# Patient Record
Sex: FEMALE | Race: WHITE | NOT HISPANIC OR LATINO | Employment: OTHER | URBAN - METROPOLITAN AREA
[De-identification: names, ages, dates, MRNs, and addresses within clinical notes are randomized per-mention and may not be internally consistent; named-entity substitution may affect disease eponyms.]

---

## 2024-08-07 PROBLEM — M79.604 LUMBAR PAIN WITH RADIATION DOWN RIGHT LEG: Status: ACTIVE | Noted: 2024-08-07

## 2024-08-07 PROBLEM — R23.8 PAPULE OF SKIN: Status: ACTIVE | Noted: 2024-08-07

## 2024-08-07 PROBLEM — M54.50 LUMBAR PAIN WITH RADIATION DOWN RIGHT LEG: Status: ACTIVE | Noted: 2024-08-07

## 2024-08-07 PROBLEM — I10 HTN (HYPERTENSION): Status: ACTIVE | Noted: 2024-08-07

## 2024-08-07 PROBLEM — M19.90 ARTHRITIS: Status: ACTIVE | Noted: 2024-08-07

## 2024-08-07 PROBLEM — R03.0 ELEVATED BP WITHOUT DIAGNOSIS OF HYPERTENSION: Status: ACTIVE | Noted: 2024-08-07

## 2024-08-07 PROBLEM — E78.5 HYPERLIPIDEMIA: Status: ACTIVE | Noted: 2024-08-07

## 2024-08-07 PROBLEM — R00.2 PALPITATION: Status: ACTIVE | Noted: 2024-08-07

## 2024-08-07 PROBLEM — M79.89 SWELLING OF LOWER EXTREMITY: Status: ACTIVE | Noted: 2024-08-07

## 2024-08-07 PROBLEM — M47.816 DEGENERATIVE JOINT DISEASE (DJD) OF LUMBAR SPINE: Status: ACTIVE | Noted: 2024-08-07

## 2024-08-09 ENCOUNTER — OFFICE VISIT (OUTPATIENT)
Dept: PRIMARY CARE | Facility: CLINIC | Age: 69
End: 2024-08-09
Payer: MEDICARE

## 2024-08-09 VITALS
WEIGHT: 244.4 LBS | DIASTOLIC BLOOD PRESSURE: 82 MMHG | SYSTOLIC BLOOD PRESSURE: 142 MMHG | HEART RATE: 72 BPM | BODY MASS INDEX: 41.73 KG/M2 | OXYGEN SATURATION: 98 % | HEIGHT: 64 IN

## 2024-08-09 DIAGNOSIS — E78.00 PURE HYPERCHOLESTEROLEMIA: ICD-10-CM

## 2024-08-09 DIAGNOSIS — Z13.29 SCREENING FOR THYROID DISORDER: ICD-10-CM

## 2024-08-09 DIAGNOSIS — Z12.11 COLON CANCER SCREENING: ICD-10-CM

## 2024-08-09 DIAGNOSIS — E66.01 CLASS 3 SEVERE OBESITY DUE TO EXCESS CALORIES WITHOUT SERIOUS COMORBIDITY WITH BODY MASS INDEX (BMI) OF 40.0 TO 44.9 IN ADULT (MULTI): ICD-10-CM

## 2024-08-09 DIAGNOSIS — I10 PRIMARY HYPERTENSION: ICD-10-CM

## 2024-08-09 DIAGNOSIS — Z12.4 CERVICAL CANCER SCREENING: ICD-10-CM

## 2024-08-09 DIAGNOSIS — M54.16 LUMBAR RADICULOPATHY, RIGHT: Primary | ICD-10-CM

## 2024-08-09 DIAGNOSIS — Z12.31 ENCOUNTER FOR SCREENING MAMMOGRAM FOR MALIGNANT NEOPLASM OF BREAST: ICD-10-CM

## 2024-08-09 PROCEDURE — 3008F BODY MASS INDEX DOCD: CPT | Performed by: INTERNAL MEDICINE

## 2024-08-09 PROCEDURE — 1159F MED LIST DOCD IN RCRD: CPT | Performed by: INTERNAL MEDICINE

## 2024-08-09 PROCEDURE — 1123F ACP DISCUSS/DSCN MKR DOCD: CPT | Performed by: INTERNAL MEDICINE

## 2024-08-09 PROCEDURE — 3079F DIAST BP 80-89 MM HG: CPT | Performed by: INTERNAL MEDICINE

## 2024-08-09 PROCEDURE — 1158F ADVNC CARE PLAN TLK DOCD: CPT | Performed by: INTERNAL MEDICINE

## 2024-08-09 PROCEDURE — 3077F SYST BP >= 140 MM HG: CPT | Performed by: INTERNAL MEDICINE

## 2024-08-09 PROCEDURE — 1036F TOBACCO NON-USER: CPT | Performed by: INTERNAL MEDICINE

## 2024-08-09 PROCEDURE — 99214 OFFICE O/P EST MOD 30 MIN: CPT | Performed by: INTERNAL MEDICINE

## 2024-08-09 RX ORDER — IBUPROFEN 200 MG
200 TABLET ORAL EVERY 6 HOURS
COMMUNITY

## 2024-08-09 ASSESSMENT — PATIENT HEALTH QUESTIONNAIRE - PHQ9
2. FEELING DOWN, DEPRESSED OR HOPELESS: NOT AT ALL
1. LITTLE INTEREST OR PLEASURE IN DOING THINGS: NOT AT ALL
SUM OF ALL RESPONSES TO PHQ9 QUESTIONS 1 & 2: 0

## 2024-08-09 ASSESSMENT — ENCOUNTER SYMPTOMS
DEPRESSION: 0
OCCASIONAL FEELINGS OF UNSTEADINESS: 0
LOSS OF SENSATION IN FEET: 0

## 2024-08-09 NOTE — PROGRESS NOTES
"Internal Medicine Outpatient Visit  Chief Complaint   Patient presents with    Sciatica     Sciatic nerve on right side, x 6 years.        HPI: Emilie Fair is of 68 y.o. who is here for Internal Visit for the following issues:  Patient is seen in office today after labs of almost 2 years with chief complaint of backache radiating to the left leg.  Pain is worse after the activity.  She has no weakness of the legs.  She denies any bladder and bowel disturbances.  Did not have any recent fall.  She has no respiratory symptoms.  Denies any chest pain or palpitation.  Has no nausea matting pain abdomen.  She has no weakness of any extremity.  Besides the right leg pain and back pain she has no other joint pain or swelling.  She has no cold intolerance any symptoms of thyroid dysfunction.  Denies any urinary symptoms.  Review of other systems are negative.    Past Surgical History:   Procedure Laterality Date    OTHER SURGICAL HISTORY  12/27/2021    No history of surgery       No family history on file.      Current Outpatient Medications on File Prior to Visit   Medication Sig Dispense Refill    ibuprofen (Motrin IB) 200 mg tablet Take 1 tablet (200 mg) by mouth every 6 hours.       No current facility-administered medications on file prior to visit.       Blood pressure 142/82, pulse 72, height 1.626 m (5' 4\"), weight 111 kg (244 lb 6.4 oz), SpO2 98%.  Body mass index is 41.95 kg/m².  General examination: Severe obesity is notable  Eyes: There is no pallor or jaundice pupils are equal and reactive to light  Oral cavity throat normal  Neck: There is no carotid bruit or JVD  Lungs: Breath sounds are normal  CVS: Heart sounds are regular there is a soft systolic murmur noted in the left upper parasternal area.  Abdomen: There is no megaly tenderness  CNS: All cranial nerves are intact power is normal.  There is subjective sensation of pain on the lateral aspect of right thigh.  But no actual loss of sensations is " noted.  Back: There is no local deformity or tenderness  Legs: No edema  Skin: No rash    Assessment and plan:    1. Lumbar radiculopathy, right  Patient has history of degenerative disease of lumbar spine.  She had x-ray of the back done a couple of years ago which showed degenerative changes.  She was referred for physical therapy without significant improvement.  I will order MRI for further evaluation.  - Comprehensive metabolic panel; Future  - CBC and Auto Differential; Future  - Lipid panel; Future  - Tsh With Reflex To Free T4 If Abnormal; Future  - MR lumbar spine wo IV contrast; Future    2. Primary hypertension  Patient blood pressure readings are still high.  She is not taking any medication lifestyle modifications including exercise cutting down salt and fatty food is discussed I will bring him back in a few weeks for repeat blood pressure checkup and advise regarding starting medications if needed  - Comprehensive metabolic panel; Future  - CBC and Auto Differential; Future  - Lipid panel; Future  - Tsh With Reflex To Free T4 If Abnormal; Future    3. Pure hypercholesterolemia  Patient is stopped Crestor on her own.  Repeat lipid panel is being ordered.  Advised to continue with a low-fat diet exercise in the meantime  - Comprehensive metabolic panel; Future  - CBC and Auto Differential; Future  - Lipid panel; Future  - Tsh With Reflex To Free T4 If Abnormal; Future    4. Class 3 severe obesity due to excess calories without serious comorbidity with body mass index (BMI) of 40.0 to 44.9 in adult (Multi)  She is educated about cardiovascular risk factors.  Encouraged to cut down calorie intake and exercise regularly  - Comprehensive metabolic panel; Future  - CBC and Auto Differential; Future  - Lipid panel; Future  - Tsh With Reflex To Free T4 If Abnormal; Future    5. Encounter for screening mammogram for malignant neoplasm of breast  Last mammogram was done in 2022.  Repeat order is given  - BI mammo  bilateral screening tomosynthesis; Future  - Comprehensive metabolic panel; Future  - CBC and Auto Differential; Future  - Lipid panel; Future  - Tsh With Reflex To Free T4 If Abnormal; Future    6. Colon cancer screening  According to her she has a colonoscopy done about 3 years ago.  Repeat colonoscopy is planned according to the patient by her gastroenterologist.  New referral is given  - Referral to Gastroenterology; Future  - Comprehensive metabolic panel; Future  - CBC and Auto Differential; Future  - Lipid panel; Future  - Tsh With Reflex To Free T4 If Abnormal; Future    7. Cervical cancer screening  Patient had pelvic examination about 2 years ago.  She needs a new referral which is given to the patient  - Referral to Gynecology; Future  - Comprehensive metabolic panel; Future  - CBC and Auto Differential; Future  - Lipid panel; Future  - Tsh With Reflex To Free T4 If Abnormal; Future    8. Screening for thyroid disorder  - Comprehensive metabolic panel; Future  - CBC and Auto Differential; Future  - Lipid panel; Future  - Tsh With Reflex To Free T4 If Abnormal; Future    Follow-up appointment will depend on the results of the test ordered today.

## 2024-08-13 ENCOUNTER — HOSPITAL ENCOUNTER (OUTPATIENT)
Dept: RADIOLOGY | Facility: CLINIC | Age: 69
End: 2024-08-13
Payer: MEDICARE

## 2024-08-19 ENCOUNTER — PATIENT OUTREACH (OUTPATIENT)
Dept: CARE COORDINATION | Facility: CLINIC | Age: 69
End: 2024-08-19
Payer: MEDICARE

## 2024-08-19 NOTE — PROGRESS NOTES
Date: 08/19/24    Dear Emilie Fair    Our records indicate that you are due for the following appointment or test: Annual Wellness Visit      Please call our office at your earliest convenience. We can assist you with scheduling an appointment or test.  If you have already scheduled an appointment or have completed testing, please disregard this letter.    Sincerely,    Abena Mccall    [facility]  [address] [place] [address]    Office Phone: [phone]  Patient Navigator: [phone]

## 2024-09-05 ENCOUNTER — APPOINTMENT (OUTPATIENT)
Dept: RADIOLOGY | Facility: CLINIC | Age: 69
End: 2024-09-05
Payer: MEDICARE

## 2024-11-19 ENCOUNTER — HOSPITAL ENCOUNTER (EMERGENCY)
Facility: HOSPITAL | Age: 69
Discharge: HOME | End: 2024-11-19
Attending: EMERGENCY MEDICINE
Payer: MEDICARE

## 2024-11-19 ENCOUNTER — APPOINTMENT (OUTPATIENT)
Dept: RADIOLOGY | Facility: HOSPITAL | Age: 69
End: 2024-11-19
Payer: MEDICARE

## 2024-11-19 VITALS
SYSTOLIC BLOOD PRESSURE: 186 MMHG | HEIGHT: 64 IN | TEMPERATURE: 98.2 F | DIASTOLIC BLOOD PRESSURE: 89 MMHG | WEIGHT: 240 LBS | BODY MASS INDEX: 40.97 KG/M2 | RESPIRATION RATE: 22 BRPM | OXYGEN SATURATION: 99 % | HEART RATE: 88 BPM

## 2024-11-19 DIAGNOSIS — M25.561 ACUTE PAIN OF RIGHT KNEE: Primary | ICD-10-CM

## 2024-11-19 DIAGNOSIS — M17.11 OSTEOARTHRITIS OF RIGHT KNEE, UNSPECIFIED OSTEOARTHRITIS TYPE: ICD-10-CM

## 2024-11-19 PROCEDURE — 73564 X-RAY EXAM KNEE 4 OR MORE: CPT | Mod: RT

## 2024-11-19 PROCEDURE — 2500000004 HC RX 250 GENERAL PHARMACY W/ HCPCS (ALT 636 FOR OP/ED): Performed by: EMERGENCY MEDICINE

## 2024-11-19 PROCEDURE — 73564 X-RAY EXAM KNEE 4 OR MORE: CPT | Mod: RIGHT SIDE | Performed by: RADIOLOGY

## 2024-11-19 PROCEDURE — 99283 EMERGENCY DEPT VISIT LOW MDM: CPT

## 2024-11-19 PROCEDURE — 96372 THER/PROPH/DIAG INJ SC/IM: CPT | Performed by: EMERGENCY MEDICINE

## 2024-11-19 RX ORDER — NAPROXEN 500 MG/1
500 TABLET ORAL
Qty: 28 TABLET | Refills: 0 | Status: SHIPPED | OUTPATIENT
Start: 2024-11-19 | End: 2024-12-03

## 2024-11-19 RX ORDER — KETOROLAC TROMETHAMINE 15 MG/ML
15 INJECTION, SOLUTION INTRAMUSCULAR; INTRAVENOUS ONCE
Status: COMPLETED | OUTPATIENT
Start: 2024-11-19 | End: 2024-11-19

## 2024-11-19 ASSESSMENT — PAIN - FUNCTIONAL ASSESSMENT
PAIN_FUNCTIONAL_ASSESSMENT: 0-10
PAIN_FUNCTIONAL_ASSESSMENT: 0-10

## 2024-11-19 ASSESSMENT — COLUMBIA-SUICIDE SEVERITY RATING SCALE - C-SSRS
6. HAVE YOU EVER DONE ANYTHING, STARTED TO DO ANYTHING, OR PREPARED TO DO ANYTHING TO END YOUR LIFE?: NO
2. HAVE YOU ACTUALLY HAD ANY THOUGHTS OF KILLING YOURSELF?: NO
1. IN THE PAST MONTH, HAVE YOU WISHED YOU WERE DEAD OR WISHED YOU COULD GO TO SLEEP AND NOT WAKE UP?: NO

## 2024-11-19 ASSESSMENT — LIFESTYLE VARIABLES
EVER HAD A DRINK FIRST THING IN THE MORNING TO STEADY YOUR NERVES TO GET RID OF A HANGOVER: NO
HAVE YOU EVER FELT YOU SHOULD CUT DOWN ON YOUR DRINKING: NO
HAVE PEOPLE ANNOYED YOU BY CRITICIZING YOUR DRINKING: NO
EVER FELT BAD OR GUILTY ABOUT YOUR DRINKING: NO
TOTAL SCORE: 0

## 2024-11-19 ASSESSMENT — PAIN SCALES - GENERAL
PAINLEVEL_OUTOF10: 0 - NO PAIN
PAINLEVEL_OUTOF10: 8
PAINLEVEL_OUTOF10: 0 - NO PAIN

## 2024-11-19 ASSESSMENT — PAIN DESCRIPTION - ORIENTATION: ORIENTATION: RIGHT

## 2024-11-19 ASSESSMENT — PAIN DESCRIPTION - LOCATION: LOCATION: LEG

## 2024-11-19 NOTE — ED PROVIDER NOTES
Emergency Department Provider Note        History of Present Illness     History provided by: Patient  Limitations to History: None  External Records Reviewed with Brief Summary: None    HPI:  Emilie Fair is a 68 y.o. female The patient presents with a chief complaint of right knee pain for the past month. The pain initially began after the patient banged their leg, resulting in swelling that eventually improved. Two weeks later, the patient [place], causing scratches on both sides of their leg. Since attending a wedding on Saturday and dancing, the patient has experienced clicking in their right knee. The pain is described as 8 out of 10 in intensity.    The patient reports pain in specific spots around the knee, particularly when pressing on certain areas and during specific movements, such as pushing. They have found relief with acetaminophen and Motrin, which has reduced the swelling and improved mobility. The patient reports difficulty walking due to pain and moves slowly as a result.    The patient denies taking any other medications, although they mention they are supposed to be on medication for blood pressure. They report occasional alcohol consumption and marijuana use, with no history of cigarette smoking. The patient has no known allergies to medications, no history of surgeries, and no family history of cancer or blood clots. The patient expresses concern about the possibility of needing surgery and is considering further evaluation with an x-ray, sports therapist, or orthopedic surgeon for potential rehabilitation or injections.    Physical Exam   Triage vitals:  T 36.8 °C (98.2 °F)  HR 88  BP (!) 186/89  RR (!) 22  O2 99 % None (Room air)    General: Awake, alert, in no acute distress  Eyes: Gaze conjugate.  No scleral icterus or injection  HENT: Normo-cephalic, atraumatic. No stridor  CV: Regular rate, regular rhythm. Radial pulses 2+  bilaterally  Resp: Breathing non-labored, speaking in full sentences.  Clear to auscultation bilaterally  GI: Soft, non-distended, non-tender. No rebound or guarding.  MSK/Extremities: No gross bony deformities. Moving all extremities. Negative Lever sign. No valgus or varus pain. Pain with knee extension against resistance.  Skin: Warm. Appropriate color  Neuro: Alert. Oriented. Face symmetric. Speech is fluent.  Gross strength and sensation intact in b/l UE and LEs  Psych: Appropriate mood and affect    Medical Decision Making & ED Course   Medical Decision Makin y.o. female presenting with right knee pain and clicking, with a history of trauma from a bridge fall one month ago and recent exacerbation during dancing. The patient also has a history of hypertension and reports occasional alcohol consumption and marijuana use.    Based on the patient's symptoms and history, the primary concern is for meniscus, ligamentous, or osteoarthritis injury. Additionally, the patient's hypertension and medication non-adherence need to be addressed but is likely elevated secondary to her pain currently.    We will plan to obtain a knee x-ray to evaluate for any structural damage or fractures.  Will provide referral to sports therapist or orthopedic surgeon for further evaluation and possible rehabilitation or injections. Advise patient to avoid activities that exacerbate pain and to use over-the-counter pain relievers as needed. Monitor pain and consider prescription pain relief if the pain increases.    For hypertension management, reinforce the importance of taking prescribed blood pressure medication regularly and schedule a follow-up appointment to monitor blood pressure and medication adherence. ecommend regular exercise and a healthy diet to support overall health and well-being.  ----   Social Determinants of Health which Significantly Impact Care: None identified     EKG Independent Interpretation: EKG not  obtained    Independent Result Review and Interpretation: Relevant laboratory and radiographic results were reviewed and independently interpreted by myself.  As necessary, they are commented on in the ED Course.    Chronic conditions affecting the patient's care: As documented above in [facility]    The patient was discussed with the following consultants/services: None    Care Considerations: As documented above in [facility]    ED Course:  ED Course as of 11/20/24 0913 Tue Nov 19, 2024   1440 XR knee right 4+ views  Unimpressive for fracture or dislocation but there is development of osteoarthritis with mild joint effusion. [ES]      ED Course User Index  [ES] Chicho Diana MD         Diagnoses as of 11/20/24 0913   Acute pain of right knee   Osteoarthritis of right knee, unspecified osteoarthritis type     Disposition   As a result of the work-up, the patient was discharged home.  she was informed of her diagnosis and instructed to come back with any concerns or worsening of condition.  she and was agreeable to the plan as discussed above.  she was given the opportunity to ask questions.  All of the patient's questions were answered.    Procedures   Procedures    This was a shared visit with an ED attending.  The patient was seen and discussed with the ED attending    Chicho Diana MD  Emergency Medicine, PGY3     Chicho Diana MD  Resident  11/20/24 0913

## 2024-11-19 NOTE — DISCHARGE INSTRUCTIONS
You came to the emergency department (ED) after having right knee pain. We evaluated you and did not find any life-threatening injuries.     Steps to take at home:  - You can use ice packs or take acetaminophen (eg, Tylenol) and ibuprofen (eg, Motrin or Advil) for pain.  -Utilize knee strengthening exercises provided    Seek immediate medical attention if you develop: new or worsening weakness, leg swelling, loss of motion in your arms or legs, loss of control of your urine or stool, difficulty waking from sleep, or any new or worsening symptoms.

## 2024-12-19 ENCOUNTER — APPOINTMENT (OUTPATIENT)
Dept: ORTHOPEDIC SURGERY | Facility: CLINIC | Age: 69
End: 2024-12-19
Payer: MEDICARE

## 2024-12-19 DIAGNOSIS — M25.561 ACUTE PAIN OF RIGHT KNEE: ICD-10-CM

## 2024-12-19 PROCEDURE — 1123F ACP DISCUSS/DSCN MKR DOCD: CPT | Performed by: ORTHOPAEDIC SURGERY

## 2024-12-19 PROCEDURE — 99203 OFFICE O/P NEW LOW 30 MIN: CPT | Performed by: ORTHOPAEDIC SURGERY

## 2024-12-19 PROCEDURE — 20610 DRAIN/INJ JOINT/BURSA W/O US: CPT | Performed by: ORTHOPAEDIC SURGERY

## 2024-12-19 PROCEDURE — 1036F TOBACCO NON-USER: CPT | Performed by: ORTHOPAEDIC SURGERY

## 2024-12-19 PROCEDURE — 1159F MED LIST DOCD IN RCRD: CPT | Performed by: ORTHOPAEDIC SURGERY

## 2024-12-19 RX ORDER — TRIAMCINOLONE ACETONIDE 40 MG/ML
40 INJECTION, SUSPENSION INTRA-ARTICULAR; INTRAMUSCULAR
Status: COMPLETED | OUTPATIENT
Start: 2024-12-19 | End: 2024-12-19

## 2024-12-19 RX ORDER — LIDOCAINE HYDROCHLORIDE 10 MG/ML
2 INJECTION, SOLUTION INFILTRATION; PERINEURAL
Status: COMPLETED | OUTPATIENT
Start: 2024-12-19 | End: 2024-12-19

## 2024-12-19 NOTE — PROGRESS NOTES
History of Present Illness:  Patient presents with right knee pain.  The patient localizes the pain diffusely.  Recently there has been concern for falls and instability.  There is increasing difficulty with activities of daily living and significant disability related to the knee pain.  The patient endorses the following failed non-operative treatments: Rest, ice.   There is increasing frustration with persistent pain and swelling and decreasing distance of ambulation.  Pain is moderate, achy, diffuse.  Better with rest, worse with activity.     She had some mild pain but then has had 2 injuries in a short period of time 1 when she fell through small bridge while hiking.    Review of Systems   GENERAL: Negative for malaise, significant weight loss, fever  MUSCULOSKELETAL: see HPI  NEURO:  Negative    History reviewed. No pertinent past medical history.  Past Surgical History:   Procedure Laterality Date    OTHER SURGICAL HISTORY  12/27/2021    No history of surgery       Current Outpatient Medications:     ibuprofen (Motrin IB) 200 mg tablet, Take 1 tablet (200 mg) by mouth every 6 hours., Disp: , Rfl:       Physical Examination:  Right Knee:  Skin healthy and intact  No gross swelling or ecchymosis  Alignment: neutral      Effusion: mild       ROM: Decreased  Crepitance with range of motion  No pain with internal rotation of the hip  Tenderness to palpation over medial and lateral joint line and with patellar compression     No laxity to valgus stress  No laxity to varus stress  Negative Lachman´s test  Negative posterior drawer test  Mild pain with Greyson´s test  Neurovascular exam normal distally  2+ DP pulse and good cap refill    Radiographs:  Moderate patellofemoral degenerative joint disease with loss of joint space, subchondral sclerosis and cystic changes, and osteophyte formation    Assessment:  Patient with right knee osteoarthritis in the setting of some acute injuries    Plan:  We discussed the  diagnosis of degenerative joint disease of the knee.  We reviewed an evidence-based approach to osteoarthritis of the knee.  We strongly encouraged low-impact aerobic activity and non-opioid analgesics.       Based on the injuries we discussed possibility meniscal pathology insufficiency fracture etc.  Discussed aspiration injection and gentle physical therapy.    L Inj/Asp: R knee on 12/19/2024 1:46 PM  Indications: pain  Details: 22 G needle, anteromedial approach  Medications: 2 mL lidocaine 10 mg/mL (1 %); 40 mg triamcinolone acetonide 40 mg/mL  Aspirate: 20 mL clear and yellow  Outcome: tolerated well, no immediate complications  Procedure, treatment alternatives, risks and benefits explained, specific risks discussed. Consent was given by the patient. Immediately prior to procedure a time out was called to verify the correct patient, procedure, equipment, support staff and site/side marked as required. Patient was prepped and draped in the usual sterile fashion.

## 2025-02-13 ENCOUNTER — TELEPHONE (OUTPATIENT)
Dept: PRIMARY CARE | Facility: CLINIC | Age: 70
End: 2025-02-13
Payer: MEDICARE

## 2025-03-06 ENCOUNTER — HOSPITAL ENCOUNTER (OUTPATIENT)
Dept: RADIOLOGY | Facility: CLINIC | Age: 70
Discharge: HOME | End: 2025-03-06
Payer: MEDICARE

## 2025-03-06 DIAGNOSIS — M43.16 ANTEROLISTHESIS OF LUMBAR SPINE: ICD-10-CM

## 2025-03-06 DIAGNOSIS — M48.061 SPINAL STENOSIS OF LUMBAR REGION, UNSPECIFIED WHETHER NEUROGENIC CLAUDICATION PRESENT: Primary | ICD-10-CM

## 2025-03-06 DIAGNOSIS — M54.16 LUMBAR RADICULOPATHY, RIGHT: ICD-10-CM

## 2025-03-06 PROCEDURE — 72148 MRI LUMBAR SPINE W/O DYE: CPT

## 2025-03-14 ENCOUNTER — TELEPHONE (OUTPATIENT)
Dept: PRIMARY CARE | Facility: CLINIC | Age: 70
End: 2025-03-14
Payer: MEDICARE

## 2025-03-14 NOTE — TELEPHONE ENCOUNTER
----- Message from Mary Mtz sent at 3/6/2025  4:03 PM EST -----  I called the patient to give the results of MRI.  There was no response I left a message to call back.  The MRI shows that she has narrowing of the spinal canal and there is some indication of pressure on the nerves.  She needs to see an spinal surgeon.  I have placed a referral in the chart.  She has not been seen in our office for more than 6 months she also needs to make a follow-up appointment in our office  ----- Message -----  From: Interface, Radiology Results In  Sent: 3/6/2025   3:34 PM EST  To: Mary Mtz MD

## 2025-04-15 ENCOUNTER — APPOINTMENT (OUTPATIENT)
Dept: OBSTETRICS AND GYNECOLOGY | Facility: CLINIC | Age: 70
End: 2025-04-15
Payer: MEDICARE

## 2025-04-15 ENCOUNTER — HOSPITAL ENCOUNTER (OUTPATIENT)
Dept: RADIOLOGY | Facility: CLINIC | Age: 70
Discharge: HOME | End: 2025-04-15
Payer: MEDICARE

## 2025-04-15 VITALS
SYSTOLIC BLOOD PRESSURE: 178 MMHG | WEIGHT: 245 LBS | BODY MASS INDEX: 56.7 KG/M2 | DIASTOLIC BLOOD PRESSURE: 89 MMHG | HEIGHT: 55 IN

## 2025-04-15 DIAGNOSIS — Z12.31 ENCOUNTER FOR SCREENING MAMMOGRAM FOR MALIGNANT NEOPLASM OF BREAST: Primary | ICD-10-CM

## 2025-04-15 DIAGNOSIS — Z12.31 ENCOUNTER FOR SCREENING MAMMOGRAM FOR MALIGNANT NEOPLASM OF BREAST: ICD-10-CM

## 2025-04-15 PROCEDURE — 1123F ACP DISCUSS/DSCN MKR DOCD: CPT | Performed by: ADVANCED PRACTICE MIDWIFE

## 2025-04-15 PROCEDURE — 3008F BODY MASS INDEX DOCD: CPT | Performed by: ADVANCED PRACTICE MIDWIFE

## 2025-04-15 PROCEDURE — 3079F DIAST BP 80-89 MM HG: CPT | Performed by: ADVANCED PRACTICE MIDWIFE

## 2025-04-15 PROCEDURE — 99203 OFFICE O/P NEW LOW 30 MIN: CPT | Performed by: ADVANCED PRACTICE MIDWIFE

## 2025-04-15 PROCEDURE — 77067 SCR MAMMO BI INCL CAD: CPT

## 2025-04-15 PROCEDURE — 1159F MED LIST DOCD IN RCRD: CPT | Performed by: ADVANCED PRACTICE MIDWIFE

## 2025-04-15 PROCEDURE — 3077F SYST BP >= 140 MM HG: CPT | Performed by: ADVANCED PRACTICE MIDWIFE

## 2025-04-15 PROCEDURE — 1036F TOBACCO NON-USER: CPT | Performed by: ADVANCED PRACTICE MIDWIFE

## 2025-04-15 NOTE — PROGRESS NOTES
"Assessment/Plan   Emilie was seen today for breast mass and breast concerns.  Diagnoses and all orders for this visit:  Encounter for screening mammogram for malignant neoplasm of breast  -     BI mammo bilateral screening tomosynthesis; Future  Negative for clinical findings  Reviewed guidelines for breast cancer screening by mammogram and recommended pt to schedule screening mammogram  Declines pelvic exam    Follow up q 1-2 years or sooner as needed    Subjective   Emilie Fair is a 69 y.o. female who presents for breast exam.   No concern of breasts, bladder or vulva/vagina.  Last mammogram 2022 [facility] 1    Menstrual History:  OB History    No obstetric history on file.       No LMP recorded. Patient is postmenopausal.       ROS as in HPI    Objective   /89   Ht 1 m (3' 3.37\")   Wt 111 kg (245 lb)   .13 kg/m²     Physical Exam  Constitutional:       General: She is not in acute distress.  Pulmonary:      Effort: Pulmonary effort is normal.   Chest:      Chest wall: No mass.   Breasts:     Right: Normal.      Left: Normal.   Lymphadenopathy:      Upper Body:      Right upper body: No supraclavicular, axillary or pectoral adenopathy.      Left upper body: No supraclavicular, axillary or pectoral adenopathy.   Skin:     General: Skin is warm and dry.      Findings: No lesion.   Neurological:      Mental Status: She is alert.           "

## 2025-04-18 ENCOUNTER — HOSPITAL ENCOUNTER (OUTPATIENT)
Dept: RADIOLOGY | Facility: EXTERNAL LOCATION | Age: 70
Discharge: HOME | End: 2025-04-18

## 2025-04-28 ENCOUNTER — APPOINTMENT (OUTPATIENT)
Dept: NEUROSURGERY | Facility: CLINIC | Age: 70
End: 2025-04-28
Payer: MEDICARE

## 2025-04-28 VITALS
WEIGHT: 246.2 LBS | DIASTOLIC BLOOD PRESSURE: 80 MMHG | TEMPERATURE: 98.2 F | BODY MASS INDEX: 42.03 KG/M2 | HEART RATE: 68 BPM | SYSTOLIC BLOOD PRESSURE: 136 MMHG | HEIGHT: 64 IN

## 2025-04-28 DIAGNOSIS — M79.604 LUMBAR PAIN WITH RADIATION DOWN RIGHT LEG: Primary | ICD-10-CM

## 2025-04-28 DIAGNOSIS — M48.061 SPINAL STENOSIS OF LUMBAR REGION, UNSPECIFIED WHETHER NEUROGENIC CLAUDICATION PRESENT: ICD-10-CM

## 2025-04-28 DIAGNOSIS — M54.50 LUMBAR PAIN WITH RADIATION DOWN RIGHT LEG: Primary | ICD-10-CM

## 2025-04-28 DIAGNOSIS — M43.16 ANTEROLISTHESIS OF LUMBAR SPINE: ICD-10-CM

## 2025-04-28 PROCEDURE — 1159F MED LIST DOCD IN RCRD: CPT | Performed by: PHYSICIAN ASSISTANT

## 2025-04-28 PROCEDURE — 1125F AMNT PAIN NOTED PAIN PRSNT: CPT | Performed by: PHYSICIAN ASSISTANT

## 2025-04-28 PROCEDURE — 3079F DIAST BP 80-89 MM HG: CPT | Performed by: PHYSICIAN ASSISTANT

## 2025-04-28 PROCEDURE — 3075F SYST BP GE 130 - 139MM HG: CPT | Performed by: PHYSICIAN ASSISTANT

## 2025-04-28 PROCEDURE — 1123F ACP DISCUSS/DSCN MKR DOCD: CPT | Performed by: PHYSICIAN ASSISTANT

## 2025-04-28 PROCEDURE — 3008F BODY MASS INDEX DOCD: CPT | Performed by: PHYSICIAN ASSISTANT

## 2025-04-28 PROCEDURE — 99213 OFFICE O/P EST LOW 20 MIN: CPT | Performed by: PHYSICIAN ASSISTANT

## 2025-04-28 PROCEDURE — 1036F TOBACCO NON-USER: CPT | Performed by: PHYSICIAN ASSISTANT

## 2025-04-28 ASSESSMENT — PATIENT HEALTH QUESTIONNAIRE - PHQ9
1. LITTLE INTEREST OR PLEASURE IN DOING THINGS: NOT AT ALL
2. FEELING DOWN, DEPRESSED OR HOPELESS: NOT AT ALL
SUM OF ALL RESPONSES TO PHQ9 QUESTIONS 1 & 2: 0

## 2025-04-28 ASSESSMENT — PAIN SCALES - GENERAL: PAINLEVEL_OUTOF10: 5

## 2025-04-28 NOTE — PROGRESS NOTES
[facility] [place]  Department of Neurological Surgery  Established Patient Visit    History of Present Illness  Emilie Fair is a 69 y.o. year old female who presents to the spine clinic in follow up with continued right lower extremity nerve/sciatic pain.  Has been ongoing over the past 6 to 7 years and was seen in 2022 for similar symptoms.  She states the location has not necessarily changed during the interim time however she has received a cortisone injection into her right knee for alternate injury and she has had improvement in pain distal from her knee since.  She denies left-sided leg pain, bowel or bladder incontinence, or coordination deficit.  It is worsened with long periods of standing and improved with rest.  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  She has progressed through physical therapy previously and continues exercises on her own at home.  Has also worked with  as well though no significant or lasting improvement identified.  Has not regularly used prescription or over-the-counter pain medication though does continue with holistic trials including garlic and turmeric.    Patient's BMI is Body mass index is 42.26 kg/m².    14/14 systems reviewed and negative other than what is listed in the history of present illness    Problem List[1]  Medical History[2]  Surgical History[3]  Social History     Tobacco Use    Smoking status: Never     Passive exposure: Never    Smokeless tobacco: Never   Substance Use Topics    Alcohol use: Yes     family history is not on file.  Current Medications[4]  Allergies[5]    Physical Examination:    General: Well developed, awake/alert/oriented x3, no distress, alert and cooperative  Skin: Warm and dry, no lesions, no rashes  ENMT: Mucous membranes moist, no apparent injury, no lesions  seen  Head/Neck: Neck Supple, no apparent injury  Respiratory/Thorax: Normal breath sounds with good chest expansion, thorax symmetric  Cardiovascular: No pitting edema, no JVD    Motor Strength: 5/5 Throughout all extremities    Muscle Bulk: Normal and symmetric in all extremities    Posture:   -- Cervical: Normal  -- Thoracic: Normal  -- Lumbar : Normal  Paraspinal muscle spasm/tenderness absent.   Midline tenderness absent    Sensation: intact to light touch         Results:  I personally reviewed and interpreted the imaging results which included as in HPI    Assessment and Plan:    Emilie Fair is a 69 y.o. year old female who presents to the spine clinic in follow up with continued right lower extremity nerve/sciatic pain.  Has been ongoing over the past 6 to 7 years and was seen in 2022 for similar symptoms.  She states the location has not necessarily changed during the interim time however she has received a cortisone injection into her right knee for alternate injury and she has had improvement in pain distal from her knee since.  She denies left-sided leg pain, bowel or bladder incontinence, or coordination deficit.  It is worsened with long periods of standing and improved with rest.  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  She has progressed through physical therapy previously and continues exercises on her own at home.  Has also worked with  as well though no significant or lasting improvement identified.  Has not regularly used prescription or over-the-counter pain medication though does continue with holistic trials including garlic and turmeric.    Had described core strengthening exercises targeting erector spinae, oblique, and abdominal musculature and patient will revisit  versus physical therapist.  Also provide her with  referral to pain management specialist for additional nonsurgical treatment options for radicular leg pain.  I further discussed to follow-up with surgeon if relief from trials continues short-lived.      I have reviewed all prior documentation and reviewed the electronic medical record since admission. I have personally have reviewed all advanced imaging not just the reports and used my interpretation as documented as the relevant findings. I have reviewed the risks and benefits of all treatment recommendations listed in this note with the patient and family.       The above clinical summary has been dictated with voice recognition software. It has not been proofread for grammatical errors, typographical mistakes, or other semantic inconsistencies.    Thank you for visiting our office today. It was our pleasure to take part in your healthcare.     Do not hesitate to call with any questions regarding your plan of care after leaving at [phone] M-F 8am-4pm.     To clinicians, thank you very much for this kind referral. It is a privilege to partner with you in the care of your patients. My office would be delighted to assist you with any further consultations or with questions regarding the plan of care outlined. Do not hesitate to call the office or contact me directly.       Sincerely,      GRETCHEN Pollock, PA-C  Associate Physician Assistant, Neurosurgery  Clinical   [facility] [place] [address]    Phone: [phone]  Fax: [phone]                   [1]   Patient Active Problem List  Diagnosis    Swelling of lower extremity    Papule of skin    Palpitation    Lumbar pain with radiation down right leg    Hyperlipidemia    HTN (hypertension)    Elevated BP without diagnosis of hypertension    Degenerative joint disease (DJD) of lumbar spine     Arthritis   [2] No past medical history on file.  [3]   Past Surgical History:  Procedure Laterality Date    OTHER SURGICAL HISTORY  12/27/2021    No history of surgery   [4]   Current Outpatient Medications:     ibuprofen (Motrin IB) 200 mg tablet, Take 1 tablet (200 mg) by mouth every 6 hours., Disp: , Rfl:   [5] No Known Allergies

## 2025-04-29 ENCOUNTER — TELEPHONE (OUTPATIENT)
Dept: PAIN MEDICINE | Facility: CLINIC | Age: 70
End: 2025-04-29
Payer: MEDICARE

## 2025-05-02 ENCOUNTER — TELEPHONE (OUTPATIENT)
Dept: PAIN MEDICINE | Facility: CLINIC | Age: 70
End: 2025-05-02
Payer: MEDICARE

## 2025-06-05 ENCOUNTER — OFFICE VISIT (OUTPATIENT)
Dept: PAIN MEDICINE | Facility: CLINIC | Age: 70
End: 2025-06-05
Payer: MEDICARE

## 2025-06-05 DIAGNOSIS — M79.604 LUMBAR PAIN WITH RADIATION DOWN RIGHT LEG: ICD-10-CM

## 2025-06-05 DIAGNOSIS — M54.50 LUMBAR PAIN WITH RADIATION DOWN RIGHT LEG: ICD-10-CM

## 2025-06-05 DIAGNOSIS — M43.16 ANTEROLISTHESIS OF LUMBAR SPINE: ICD-10-CM

## 2025-06-05 DIAGNOSIS — M46.1 SACROILIITIS: Primary | ICD-10-CM

## 2025-06-05 PROCEDURE — G2211 COMPLEX E/M VISIT ADD ON: HCPCS | Performed by: PAIN MEDICINE

## 2025-06-05 PROCEDURE — 99204 OFFICE O/P NEW MOD 45 MIN: CPT | Performed by: PAIN MEDICINE

## 2025-06-05 PROCEDURE — 1159F MED LIST DOCD IN RCRD: CPT | Performed by: PAIN MEDICINE

## 2025-06-05 PROCEDURE — 99214 OFFICE O/P EST MOD 30 MIN: CPT | Performed by: PAIN MEDICINE

## 2025-06-05 PROCEDURE — 1125F AMNT PAIN NOTED PAIN PRSNT: CPT | Performed by: PAIN MEDICINE

## 2025-06-05 ASSESSMENT — PAIN - FUNCTIONAL ASSESSMENT: PAIN_FUNCTIONAL_ASSESSMENT: 0-10

## 2025-06-05 ASSESSMENT — PAIN SCALES - GENERAL: PAINLEVEL_OUTOF10: 7

## 2025-06-05 ASSESSMENT — COLUMBIA-SUICIDE SEVERITY RATING SCALE - C-SSRS
1. IN THE PAST MONTH, HAVE YOU WISHED YOU WERE DEAD OR WISHED YOU COULD GO TO SLEEP AND NOT WAKE UP?: NO
6. HAVE YOU EVER DONE ANYTHING, STARTED TO DO ANYTHING, OR PREPARED TO DO ANYTHING TO END YOUR LIFE?: NO
2. HAVE YOU ACTUALLY HAD ANY THOUGHTS OF KILLING YOURSELF?: NO

## 2025-06-05 NOTE — H&P
History Of Present Illness  Emilie Fair is a 69 y.o. female presenting with      Right sciatic nerve pain.  From low back to buttock to calf.Recent imaging      Has been ongoing over the past 6 to 7 years and was seen in 2022 for similar symptoms. Was tried on physical therapy in the past without any significant improvement was also tried on over-the-counter nonsteroidal anti-inflammatory without any significant improvement was seen and evaluated through the spine surgery team who referred her for pain management for consideration of nerve blocks.  Rating currently her pain at a level of 7 out of 10 describing it mainly as an ache triggered by yard work and by pushing and long period of standing.  And walking with a cane not too far also will provoke her pain.  Denies any prior intervention to the lumbar spine area.  Denies any weakness in the lower extremity or any bladder or bowel dysfunction     Past Medical History  She has no past medical history on file.  Herself of being healthy  Surgical History  She has a past surgical history that includes Other surgical history (12/27/2021).   None   Social History  She reports that she has never smoked. She has never been exposed to tobacco smoke. She has never used smokeless tobacco. She reports current alcohol use. She reports that she does not use drugs.    Family History  Family History[1]     Allergies  Patient has no known allergies.    Review of Systems   12 Systems have been reviewed as follows.   Constitutional: Fever, weight gain, weight loss, appetite change, night sweats, fatigue, chills.  Eyes : blurry, double vision, vision, loss, tearing, redness, pain, sensitivity to light, glaucoma.  Ears, nose, mouth, and throat: Hearing loss, ringing in the ears, ear pain, nasal congestion, nasal drainage, nosebleeds, mouth, throat, irritation tooth problem.  Cardiovascular :chest pain, pressure, heart tracing,palpaitations , sweating, leg swelling, high or low blood  pressure  Pulmonary: Cough, yellow or green sputum, blood and sputum, shortness of breath, wheezing  Gastrointestinal: Nause, vomiting, diarrhea, constipation, pain, blood in stool, or vomitus, heartburn, difficulty swallowing  Genitourinary: incontinence, abnormal bleeding, abnormal discharge, urinary frequency, urinary hesitancy, pain, impotence sexual problem, infection, urinary retention  Musculoskeletal: Pain, stiffness, joint, redness or warmth, arthritis, back pain, weakness, muscle wasting, sprain or fracture  Neuro: Weight weakness, dizziness, change in voice, change in taste change in vision, change in hearing, loss, or change of sensation, trouble walking, balance problems coordination problems, shaking, speech problem  Endocrine , cold or heat intolerance, blood sugar problem, weight gain or loss missed periods hot flashes, sweats, change in body hair, change in libido, increased thirst, increased urination  Heme/lymph: Swelling, bleeding, problem anemia, bruising, enlarged lymph nodes  Allergic/immunologic: H. plus nasal drip, watery itchy eyes, nasal drainage, immunosuppressed  The above, were reviewed and noted negative except as noted.    Physical Exam   Vital signs reviewed, documented in chart     General:  Appears well, does not look in any major distress  Alert    HEENT:  Head atraumatic  Eyes normal inspection  PERRL  Normal ENT inspection  No signs of dehydration    NECK:  Normal inspection  Range of motion within normal     RESPIRATORY:  No respiratory distress    CVS:  Heart rate and rhythm regular    ABDOMEN/GI  Soft  Non-tender  No distention  No organomegaly      BACK:  Normal inspection, flexion and extension within normal limit   no tenderness upon the palpation of the facet joint  Right Si joints  tender to palpations   Positive John sign ,positive SI distraction test, positive compression test and positive thigh thrush test     EXTREMITIES:  Non-Tender  Full ROM  Normal  appearance  No Pedal edema  Power symmetrical , sensory examination preserved.    NEURO:  Alert and oriented X 3  CNS normal as tested without focal neurological deficit   Sensation normal  Motor normal  reflexes normal    PSYCH:  Mood normal  Affect normal    SKIN:  Color normal  No rash  Warm  Dry  no sign of skin marking supportive of IV drug usage /abuse.    Last Recorded Vitals  There were no vitals taken for this visit.    Relevant Results  MR lumbar spine wo IV contrast  Result Date: 3/6/2025  Interpreted By:  Marquise Ramos  and Mark Anthony Ritter STUDY: MR LUMBAR SPINE WO IV CONTRAST;  3/6/2025 1:23 pm   INDICATION: Signs/Symptoms:radicular leg pain.   COMPARISON: None.   ACCESSION NUMBER(S): [id number]   ORDERING CLINICIAN: ANUSHA SOLORZANO   TECHNIQUE: Sagittal STIR, sagittal T2, sagittal T1, axial T2, axial T1 weighted MRI images of the lumbar spine were obtained without intravenous contrast administration.   FINDINGS: There is 4 mm of anterolisthesis of L4 on L5 and L5 on S1.   There are degenerative signal changes and Schmorl's nodes noted along endplates within lumbar and visualized lower thoracic region with the most pronounced degenerative marrow signal changes noted along endplates at the T12/L1 and T11/12 levels.   The visualized spinal cord demonstrates no signal abnormality within it. The conus medullaris terminates at the L2 level.   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   There is diminished disc signal throughout the lumbar and visualized lower thoracic region along with loss of disc height at the T12/L1 and T11/12 levels compatible with degenerative disc disease.   At the L5/S1 level,  there is 4 mm of anterolisthesis of L5 on S1 along with hypertrophic degenerative facet changes and ligamentum flavum hypertrophy. There is a moderate narrowing of the thecal sac within the spinal canal. There is mild encroachment upon the right neural foramen there is no  significant left-sided neural foraminal narrowing.   At the L4/L5 level,  there is 4 mm of anterolisthesis of L4 on L5 along with hypertrophic degenerative facet changes and ligamentum flavum hypertrophy contributing to moderate to severe narrowing of the thecal sac in the AP dimension within the spinal canal. There is mild-to-moderate right and mild left-sided neural foraminal narrowing.   At the L3/L4 level,  there is a posterior disc bulge along with degenerative facet changes and ligamentum flavum hypertrophy. There is moderate narrowing of the thecal sac in the AP dimension within the spinal canal. There is moderate left and mild right-sided neural foraminal narrowing.   At the L2/L3 level,  there is posterior osteophytic spurring and posterior disc bulge along with degenerative facet changes and ligamentum flavum hypertrophy contributing to moderate narrowing of the thecal sac in the AP dimension within the spinal canal. There is mild-to-moderate left and mild right-sided neural foraminal narrowing.   At the L1/L2 level,  there is mild posterior osteophytic spurring and posterior disc bulge along with degenerative facet changes and ligamentum flavum contributing to mild spinal canal narrowing. There is mild-to-moderate left-sided neural foraminal narrowing. There is no significant right-sided neural foraminal narrowing.   At the T12/L1 level,  right there is a mild posterior disc bulge along with degenerative facet changes contributing to mild spinal canal narrowing. There is no significant neural foramen narrowing.   At the T11/12 level, there is posterior osteophytic spurring and posterior disc bulge along with degenerative facet changes contributing to mild to moderate spinal canal narrowing there is no significant neural foramen narrowing.   There is atrophy/fatty infiltration of the posterior paraspinal musculature within lumbar and sacral regions.         There is 4 mm of anterolisthesis of L4 on L5 and  L5 on S1.   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   There is multilevel spondylosis with varying degrees of spinal canal and neural foraminal narrowing as described above.   There is atrophy/fatty infiltration of the posterior paraspinal musculature within lumbar and sacral regions.   I personally reviewed the images/study and I agree with the findings as stated by Dr. Stone Hopson M.D. This study was interpreted at [place], [place].   MACRO: None.   Signed by: Marquise Ramos 3/6/2025 3:33 PM Dictation workstation:   [id number]        Assessment/Plan     69 years old with history and physical examination supportive of lumbar stenosis lumbar spondylolisthesis and right sacroiliitis    Plan  Knowing that the patient had the pain for multiple years and she failed conservative management with physical therapy nonsteroidal anti-inflammatory and knowing that her pain is reproduced upon the palpation of the right SI joint and I would recommend to her to have a right sided sacroiliac joint injection to be performed under fluoroscopic guidance with injection of contrast material to confirm needle placement if she does not have full relief with the SI injection I will be considering a lumbar epidural steroid injection to be performed under fluoroscopic guidance targeting the L5-S1 level with injection of contrast material benefits and risk were discussed with patient and she would like to proceed      The above clinical summary has been dictated with voice recognition software. It has not been proofread for grammatical errors, typographical mistakes, or other semantic inconsistencies.    Thank you for visiting our office today. It was our pleasure to take part in your healthcare.     Please do not hesitate to contact the pain clinic after your visit with any questions or concerns at  M-F 8-4 pm       Feng Box ,  M.D.  Medical Director , Division of Pain Medicine [facility]   of Anesthesiology and Pain Medicine  [facility] [employer] [facility] [employer]     [place] [address] [address] [place] [address]     Office: [phone]  Fax: [phone]                Feng Box MD         [1] No family history on file.

## 2025-06-26 ENCOUNTER — HOSPITAL ENCOUNTER (OUTPATIENT)
Dept: PAIN MEDICINE | Facility: CLINIC | Age: 70
Discharge: HOME | End: 2025-06-26
Payer: MEDICARE

## 2025-06-26 VITALS
HEART RATE: 65 BPM | OXYGEN SATURATION: 98 % | TEMPERATURE: 96.8 F | RESPIRATION RATE: 20 BRPM | SYSTOLIC BLOOD PRESSURE: 217 MMHG | DIASTOLIC BLOOD PRESSURE: 92 MMHG

## 2025-06-26 DIAGNOSIS — M46.1 SACROILIITIS: ICD-10-CM

## 2025-06-26 PROCEDURE — 2550000001 HC RX 255 CONTRASTS: Performed by: PAIN MEDICINE

## 2025-06-26 PROCEDURE — 27096 INJECT SACROILIAC JOINT: CPT | Performed by: PAIN MEDICINE

## 2025-06-26 PROCEDURE — 2500000004 HC RX 250 GENERAL PHARMACY W/ HCPCS (ALT 636 FOR OP/ED): Performed by: PAIN MEDICINE

## 2025-06-26 RX ORDER — METHYLPREDNISOLONE ACETATE 80 MG/ML
INJECTION, SUSPENSION INTRA-ARTICULAR; INTRALESIONAL; INTRAMUSCULAR; SOFT TISSUE AS NEEDED
Status: DISCONTINUED | OUTPATIENT
Start: 2025-06-26 | End: 2025-06-27 | Stop reason: HOSPADM

## 2025-06-26 RX ORDER — BUPIVACAINE HYDROCHLORIDE 5 MG/ML
INJECTION, SOLUTION PERINEURAL AS NEEDED
Status: DISCONTINUED | OUTPATIENT
Start: 2025-06-26 | End: 2025-06-27 | Stop reason: HOSPADM

## 2025-06-26 RX ADMIN — IOHEXOL 4 ML: 300 INJECTION, SOLUTION INTRAVENOUS at 08:26

## 2025-06-26 RX ADMIN — BUPIVACAINE HYDROCHLORIDE 10 ML: 5 INJECTION, SOLUTION PERINEURAL at 08:26

## 2025-06-26 RX ADMIN — METHYLPREDNISOLONE ACETATE 80 MG: 80 INJECTION, SUSPENSION INTRA-ARTICULAR; INTRALESIONAL; INTRAMUSCULAR; SOFT TISSUE at 08:26

## 2025-06-26 ASSESSMENT — PAIN - FUNCTIONAL ASSESSMENT
PAIN_FUNCTIONAL_ASSESSMENT: 0-10
PAIN_FUNCTIONAL_ASSESSMENT: 0-10

## 2025-06-26 ASSESSMENT — PAIN SCALES - GENERAL
PAINLEVEL_OUTOF10: 0 - NO PAIN
PAINLEVEL_OUTOF10: 7

## 2025-06-26 NOTE — DISCHARGE INSTRUCTIONS
Sacroiliac Joint Pain    About this topic  The spine ends in a set of 5 fused bones. They are called the sacrum. They join the pelvis at the sacroiliac joint. This is also called the SI joint. Strong bands of tissue called ligaments hold this joint together. Normally, there is very little movement at this joint. Its job is to absorb shock and take stress off of the spine. You can have SI joint pain if this joint is irritated.  What are the causes?  Sometimes, the exact cause of SI pain is unknown. It is not always known if the pain is in the joint or in the ligaments around the joint. The pain may be caused by wear and tear on the joint from arthritis. Pregnancy causes this joint to become looser. Sometimes, the pain may be caused by swelling from problems like gout or an injury. Infection or a fracture can also cause SI pain.  What can make this more likely to happen?  You are more likely to have this problem if you have had an injury to your spine, pelvis, or buttocks. Someone with a history of being pregnant a few times is more likely to have problems with her SI joint. Legs that are not the same length can cause pain as well. Some infections may cause problems with the SI joint.  What are the main signs?  Pain in the lower back or buttocks. It may also be felt in the hips or pelvis. Some people feel the pain in the groin or back of the legs. This pain is often worse with activity like climbing stairs or standing for a long time.  Numbness or tingling in the upper leg  Feeling of weakness in the leg  Stiffness  Feeling unstable when moving  How does the doctor diagnose this health problem?  The doctor will do an exam and feel around your lower back. Your doctor will have you bend and twist at the waist to see what makes the pain worse. Your doctor may have you move and push and pull on your legs to test your motion and strength. Your doctor will check if the muscles in the back or legs are tight. Your doctor may  check the feeling and reflexes in your legs.  The doctor may order:  X-ray  CT or MRI scan  Bone scan  Lab tests  Diagnostic injection ? injecting a drug into the joint to see if relief is given  How does the doctor treat this health problem?  Rest from activities that make the problem worse  Ice  Heat may be used later but not right away. Heat can make swelling worse.  Special belt worn low on the hips called an SI belt. It helps to hold the joint tightly together.  Electrical stimulation  Exercises  Chiropractic manipulation  Radiofrequency ablation ? A treatment where small nerves around the joint are burned so they are numb. This does not always work. It may only last for a few years.  Surgery may be needed if other treatment plans do not work.  Injections (cortisone)  Are there other health problems to treat?  If the problem is caused by an infection, inflammatory arthritis, or ankylosing spondylosis, these problems will need to be treated.  What drugs may be needed?  The doctor may order drugs to:  Help with pain and swelling  Fight an infection  The doctor may give you a shot to help with pain and swelling. Talk with your doctor about possible risks with this shot.  What problems could happen?  Long-term back pain  Weight gain, less muscle strength and flexibility, weaker bones  Arthritis  Need for surgery  Infection  What can be done to prevent this health problem?  Stay active and work out to keep your muscles strong and flexible. Warm up slowly and stretch before you exercise.  Use good posture.  Use proper ways to lift and bend:  Spread your feet apart so you have a good base of support. Then, bend with your knees when you  something from the ground.  When lifting and moving an object, keep your back straight. Keep the object as close to your body as possible. Do not twist. Instead, move your feet to the direction you are going.  Follow your doctor's orders about weight lifting.      Your pain may  not be gone immediately after the procedure--it usually takes the steroid 3-5 days to start working.   It may take several weeks for the medicine to reach its' full effect.   Pay attention to how much pain relief (what percentage compared to before the procedure) you get and for how long it lasts.     Activity: Avoid strenuous activity for 24 hours. After that return to your normal activity level.     Bandages: Remove after 24 hours     Showering/Bathing: You may shower after bandage is removed   Follow up: CALL OFFICE IN 7 DAYS [phone] LEAVE MESSAGE ABOUT THE RELIEF THAT WAS OBTAINED

## 2025-07-15 ENCOUNTER — APPOINTMENT (OUTPATIENT)
Dept: PRIMARY CARE | Facility: CLINIC | Age: 70
End: 2025-07-15
Payer: MEDICARE

## 2025-07-15 VITALS
BODY MASS INDEX: 41.18 KG/M2 | HEART RATE: 64 BPM | SYSTOLIC BLOOD PRESSURE: 122 MMHG | OXYGEN SATURATION: 94 % | DIASTOLIC BLOOD PRESSURE: 80 MMHG | WEIGHT: 241.2 LBS | HEIGHT: 64 IN

## 2025-07-15 DIAGNOSIS — I10 PRIMARY HYPERTENSION: ICD-10-CM

## 2025-07-15 DIAGNOSIS — Z13.1 SCREENING FOR DIABETES MELLITUS: ICD-10-CM

## 2025-07-15 DIAGNOSIS — Z00.00 WELLNESS EXAMINATION: Primary | ICD-10-CM

## 2025-07-15 DIAGNOSIS — E66.813 CLASS 3 SEVERE OBESITY DUE TO EXCESS CALORIES WITHOUT SERIOUS COMORBIDITY WITH BODY MASS INDEX (BMI) OF 40.0 TO 44.9 IN ADULT: ICD-10-CM

## 2025-07-15 DIAGNOSIS — E78.00 PURE HYPERCHOLESTEROLEMIA: ICD-10-CM

## 2025-07-15 DIAGNOSIS — M54.16 LUMBAR RADICULOPATHY, RIGHT: ICD-10-CM

## 2025-07-15 PROCEDURE — 3008F BODY MASS INDEX DOCD: CPT | Performed by: INTERNAL MEDICINE

## 2025-07-15 PROCEDURE — 1036F TOBACCO NON-USER: CPT | Performed by: INTERNAL MEDICINE

## 2025-07-15 PROCEDURE — 1125F AMNT PAIN NOTED PAIN PRSNT: CPT | Performed by: INTERNAL MEDICINE

## 2025-07-15 PROCEDURE — 3074F SYST BP LT 130 MM HG: CPT | Performed by: INTERNAL MEDICINE

## 2025-07-15 PROCEDURE — 3079F DIAST BP 80-89 MM HG: CPT | Performed by: INTERNAL MEDICINE

## 2025-07-15 PROCEDURE — 1159F MED LIST DOCD IN RCRD: CPT | Performed by: INTERNAL MEDICINE

## 2025-07-15 PROCEDURE — 1170F FXNL STATUS ASSESSED: CPT | Performed by: INTERNAL MEDICINE

## 2025-07-15 PROCEDURE — G0439 PPPS, SUBSEQ VISIT: HCPCS | Performed by: INTERNAL MEDICINE

## 2025-07-15 SDOH — ECONOMIC STABILITY: FOOD INSECURITY: WITHIN THE PAST 12 MONTHS, YOU WORRIED THAT YOUR FOOD WOULD RUN OUT BEFORE YOU GOT MONEY TO BUY MORE.: NEVER TRUE

## 2025-07-15 SDOH — ECONOMIC STABILITY: FOOD INSECURITY: WITHIN THE PAST 12 MONTHS, THE FOOD YOU BOUGHT JUST DIDN'T LAST AND YOU DIDN'T HAVE MONEY TO GET MORE.: NEVER TRUE

## 2025-07-15 ASSESSMENT — ACTIVITIES OF DAILY LIVING (ADL)
GROCERY_SHOPPING: INDEPENDENT
TAKING_MEDICATION: INDEPENDENT
DRESSING: INDEPENDENT
BATHING: INDEPENDENT
DOING_HOUSEWORK: INDEPENDENT
MANAGING_FINANCES: INDEPENDENT

## 2025-07-15 ASSESSMENT — PATIENT HEALTH QUESTIONNAIRE - PHQ9
SUM OF ALL RESPONSES TO PHQ9 QUESTIONS 1 & 2: 0
SUM OF ALL RESPONSES TO PHQ9 QUESTIONS 1 AND 2: 0
1. LITTLE INTEREST OR PLEASURE IN DOING THINGS: NOT AT ALL
2. FEELING DOWN, DEPRESSED OR HOPELESS: NOT AT ALL
2. FEELING DOWN, DEPRESSED OR HOPELESS: NOT AT ALL
1. LITTLE INTEREST OR PLEASURE IN DOING THINGS: NOT AT ALL

## 2025-07-15 ASSESSMENT — LIFESTYLE VARIABLES
HOW OFTEN DO YOU HAVE A DRINK CONTAINING ALCOHOL: MONTHLY OR LESS
HOW OFTEN DO YOU HAVE SIX OR MORE DRINKS ON ONE OCCASION: LESS THAN MONTHLY
SKIP TO QUESTIONS 9-10: 0
AUDIT-C TOTAL SCORE: 2
HOW MANY STANDARD DRINKS CONTAINING ALCOHOL DO YOU HAVE ON A TYPICAL DAY: 1 OR 2

## 2025-07-15 ASSESSMENT — ENCOUNTER SYMPTOMS
DEPRESSION: 0
OCCASIONAL FEELINGS OF UNSTEADINESS: 0
LOSS OF SENSATION IN FEET: 0

## 2025-07-15 ASSESSMENT — COLUMBIA-SUICIDE SEVERITY RATING SCALE - C-SSRS
6. HAVE YOU EVER DONE ANYTHING, STARTED TO DO ANYTHING, OR PREPARED TO DO ANYTHING TO END YOUR LIFE?: NO
1. IN THE PAST MONTH, HAVE YOU WISHED YOU WERE DEAD OR WISHED YOU COULD GO TO SLEEP AND NOT WAKE UP?: NO
2. HAVE YOU ACTUALLY HAD ANY THOUGHTS OF KILLING YOURSELF?: NO

## 2025-07-15 ASSESSMENT — PAIN SCALES - GENERAL: PAINLEVEL_OUTOF10: 2

## 2025-07-15 NOTE — PROGRESS NOTES
"Internal Medicine Outpatient Visit  Chief Complaint   Patient presents with    Medicare Annual Wellness Visit Subsequent     Wellness visit        HPI: Emilie Fair is of 69 y.o. who is here for Internal Visit for the following issues:  Patient is seen my office today after a long interval for annual wellness examination and follow-up of her medical problems including hypertension, hyperlipidemia, obesity and other medical problems.  Unfortunately she did not do the blood test which was ordered in the last office visit I do not have any record of any blood test for many years.  Importance of compliance is discussed with the patient.  She is she denies any fever chill Anexsia.  Has no headache or visual disturbances.  Denies any chest pain or palpitation.  She has no respiratory symptoms.  Denies any nausea vomiting or abdominal pain.  She has no weakness of any extremity.  She has some chronic backache.  She is being followed by pain management for her back pain and sciatica.  She has no polyuria, polydipsia any other symptom of uncontrolled hyperglycemia.  Review of other systems are negative.    Surgical History[1]    Family History[2]      Medications Ordered Prior to Encounter[3]    Blood pressure 122/80, pulse 64, height 1.626 m (5' 4\"), weight 109 kg (241 lb 3.2 oz), SpO2 94%.  Body mass index is 41.4 kg/m².  General exam; BMI is 41.4  Eyes: There is no pallor or jaundice pupils are equal and reactive to light  Neck: There is no JVD or thyroid enlargement  Lungs: Clear to auscultation  CVS: Heart sounds are regular there is no gallop murmur  Abdomen: Soft there is no organomegaly tenderness  CNS: Normal power  Musculoskeletal system there is no joint swelling  Legs: No edema    Assessment and plan:    1. Wellness examination (Primary)  Physical examination is remarkable for being overweight.  Educated about cardiovascular risk factors.  Encouraged to cut down calorie intake and excise regularly.  She had a " mammogram done in April of this year.  She has a flexible sigmoidoscopy done in March of this year.  She goes to her gynecologist for periodic pelvic examination.  A pneumonia vaccination was offered in the office but patient declined.  List of all the vaccinations are due is provided to the patient to think about it and get them at pharmacy.    2. Primary hypertension  Blood pressure readings are normal with the lifestyle modifications.  Will continue to monitor    3. Pure hypercholesterolemia  Advised to continue the low-fat diet exercise.  Repeat lipid panel is ordered    4. Lumbar radiculopathy, right  Patient is being followed by pain management team.  Patient is requesting a disability placard for parking.  Which is issued to the patient.    5. Class 3 severe obesity due to excess calories without serious comorbidity with body mass index (BMI) of 40.0 to 44.9 in adult  Lifestyle modifications discussed with the patient in detail.                         [1]   Past Surgical History:  Procedure Laterality Date    OTHER SURGICAL HISTORY  12/27/2021    No history of surgery   [2] No family history on file.  [3]   Current Outpatient Medications on File Prior to Visit   Medication Sig Dispense Refill    ibuprofen (Motrin IB) 200 mg tablet Take 1 tablet (200 mg) by mouth every 6 hours.       No current facility-administered medications on file prior to visit.